# Patient Record
Sex: FEMALE | Race: WHITE | URBAN - METROPOLITAN AREA
[De-identification: names, ages, dates, MRNs, and addresses within clinical notes are randomized per-mention and may not be internally consistent; named-entity substitution may affect disease eponyms.]

---

## 2018-10-18 ENCOUNTER — TRANSFERRED RECORDS (OUTPATIENT)
Dept: HEALTH INFORMATION MANAGEMENT | Facility: CLINIC | Age: 28
End: 2018-10-18

## 2018-11-14 ENCOUNTER — TELEPHONE (OUTPATIENT)
Dept: OPHTHALMOLOGY | Facility: CLINIC | Age: 28
End: 2018-11-14

## 2018-11-14 NOTE — TELEPHONE ENCOUNTER
[facility] Call Center    Phone Message    May a detailed message be left on voicemail: yes    Reason for Call: Other: per pt- wondering if she is able to do a consult with another Dr in [place] since that is closer to home before her appt with Dr. Jo, please call pt back thanks     Action Taken: Message routed to:  [facility] ([facility] Clinic

## 2018-11-15 NOTE — TELEPHONE ENCOUNTER
Spoke to pt this AM    Pt traveling from [facility]  Was seen in [place], [place] last and referred to [place] for strabismus  No diplopia  No h/o prism in glasses    Reviewed if able to see strabismus specialist closer, may schedule with them for evaluation-- no near by MD's per pt or pt's eye doctors    After discussion pt will f/u as scheduled with dr. Sandro Toscano RN 12:25 PM 11/15/18

## 2018-11-26 ENCOUNTER — TELEPHONE (OUTPATIENT)
Dept: OPHTHALMOLOGY | Facility: CLINIC | Age: 28
End: 2018-11-26

## 2018-11-26 NOTE — TELEPHONE ENCOUNTER
Left voicemail for patient as patient is currently scheduled for Dr. Jo and appointment notes say surgery consult.  Dr. Jo would be able to evaluate, but he is not a surgeon so if interested in doing surgery would need to see a different provider.  I could have her see Dr. Leonard instead who would be able to do surgery.

## 2018-12-18 ENCOUNTER — OFFICE VISIT (OUTPATIENT)
Dept: OPHTHALMOLOGY | Facility: CLINIC | Age: 28
End: 2018-12-18
Attending: OPHTHALMOLOGY
Payer: COMMERCIAL

## 2018-12-18 DIAGNOSIS — H50.15 ALTERNATING EXOTROPIA: Primary | ICD-10-CM

## 2018-12-18 DIAGNOSIS — H53.2 DOUBLE VISION: ICD-10-CM

## 2018-12-18 DIAGNOSIS — H50.21 HYPERTROPIA OF RIGHT EYE: ICD-10-CM

## 2018-12-18 DIAGNOSIS — H53.10 SUBJECTIVE VISUAL DISTURBANCE: ICD-10-CM

## 2018-12-18 PROCEDURE — 92060 SENSORIMOTOR EXAMINATION: CPT | Mod: ZF | Performed by: OPHTHALMOLOGY

## 2018-12-18 PROCEDURE — G0463 HOSPITAL OUTPT CLINIC VISIT: HCPCS | Performed by: TECHNICIAN/TECHNOLOGIST

## 2018-12-18 ASSESSMENT — VISUAL ACUITY
OS_SC+: -1
OD_SC: 20/20
OS_SC: 20/20
METHOD: SNELLEN - LINEAR

## 2018-12-18 ASSESSMENT — SLIT LAMP EXAM - LIDS
COMMENTS: NORMAL
COMMENTS: NORMAL

## 2018-12-18 ASSESSMENT — EXTERNAL EXAM - LEFT EYE: OS_EXAM: NORMAL

## 2018-12-18 ASSESSMENT — TONOMETRY
IOP_METHOD: SINGLE/SINGLE LM ICARE
OD_IOP_MMHG: 21
OS_IOP_MMHG: 19

## 2018-12-18 ASSESSMENT — CONF VISUAL FIELD
OD_NORMAL: 1
OS_NORMAL: 1

## 2018-12-18 ASSESSMENT — EXTERNAL EXAM - RIGHT EYE: OD_EXAM: NORMAL

## 2018-12-18 NOTE — PROGRESS NOTES
1. Congenital exotropia with freely alternating exotropia (though patient has left eye preference).  Discussed risks, benefits, and alternatives of strabismus surgery and patient wishes to proceed.    This patient was referred from her optometrist Dr. Oglesby in [facility].  She has a history of congenital exotropia her entire life.  She has never undergone strabismus surgery.  She only rarely experiences double vision.  Over time her eyes have wandered out more and this has become very disruptive to her social interactions.  She has never undergone neuro imaging.  She reports no changes in vision in either eye under monocular conditions.  She denies any inciting event or acute onset of strabismus.    The patient has normal afferent visual function in both eyes including normal best corrected visual acuity, color vision, confrontation visual fields, and pupillary light responses in both eyes.  Sensorimotor examination reveals a concomitant 25-35 prism diopter alternating exotropia.  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  Extraocular motility was full in both eyes.  Structural eye examination including dilated fundus exam was unremarkable in both eyes. Cranial nerves V1-3, 7,8,9,11, and 12 were normal bilaterally.     With prism  correction of her strabismus she did experience diplopia however this appeared to be a fixation switch type of diplopia and she advised me that this was not bothersome to her.    We discussed in detail the risks, benefits, and alternatives of eye muscle correction surgery including the very rare risk of death or serious morbidity from a general anesthesia complication and the rare risk of severe vision loss in the operative eye(s) secondary to retinal detachment or endophthalmitis.  We discussed more likely sub-optimal outcomes including the unanticipated need for additional strabismus surgery.  Finally the patient was aware that prisms glasses may  be required to optimize single vision following surgery or that on VERY rare occasion she could have constant severe binocular diplopia after strabismus surgery that requires reversal of her strabismus surgery.  I do EXPECT she will have new diplopia after surgery but that she will learn to ignore it over the weeks after strabismus surgery.    After a thorough discussion of these risks, the patient decided to proceed with strabismus surgery.  The surgical plan is as follows:     1. Bilateral lateral rectus recession on fixed suture     Follow-up after strabismus surgery- will try to have phone call instead of 1-2 week post-operative visit.  [facility]  Prism free glasses for adjustment: patient does not wear glasses     Complete documentation of historical and exam elements from today's encounter can be found in the full encounter summary report (not reduplicated in this progress note).  I personally obtained the chief complaint(s) and history of present illness.  I confirmed and edited as necessary the review of systems, past medical/surgical history, family history, social history, and examination findings as documented by others; and I examined the patient myself.  I personally reviewed the relevant tests, images, and reports as documented above.  I formulated and edited as necessary the assessment and plan and discussed the findings and management plan with the patient and family     Pasquale Leonard MD

## 2018-12-18 NOTE — LETTER
2018    RE: Manisha Kilpatrick  : 1990  MRN: [id number]    Dear Dr. Oglesby,    Thank you for referring your patient, Manisha Kilpatrick, to my neuro-ophthalmology clinic recently.  After a thorough neuro-ophthalmic history and examination, I came to the following conclusions:        1. Congenital exotropia with freely alternating exotropia (though patient has left eye preference).  Discussed risks, benefits, and alternatives of strabismus surgery and patient wishes to proceed.    This patient was referred from her optometrist Dr. Oglesby in [facility].  She has a history of congenital exotropia her entire life.  She has never undergone strabismus surgery.  She only rarely experiences double vision.  Over time her eyes have wandered out more and this has become very disruptive to her social interactions.  She has never undergone neuro imaging.  She reports no changes in vision in either eye under monocular conditions.  She denies any inciting event or acute onset of strabismus.    The patient has normal afferent visual function in both eyes including normal best corrected visual acuity, color vision, confrontation visual fields, and pupillary light responses in both eyes.  Sensorimotor examination reveals a concomitant 25-35 prism diopter alternating exotropia.  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  Extraocular motility was full in both eyes.  Structural eye examination including dilated fundus exam was unremarkable in both eyes. Cranial nerves V1-3, 7,8,9,11, and 12 were normal bilaterally.     With prism  correction of her strabismus she did experience diplopia however this appeared to be a fixation switch type of diplopia and she advised me that this was not bothersome to her.    We discussed in detail the risks, benefits, and alternatives of eye muscle correction surgery including the very rare risk of death or serious morbidity from a general anesthesia  complication and the rare risk of severe vision loss in the operative eye(s) secondary to retinal detachment or endophthalmitis.  We discussed more likely sub-optimal outcomes including the unanticipated need for additional strabismus surgery.  Finally the patient was aware that prisms glasses may be required to optimize single vision following surgery or that on VERY rare occasion she could have constant severe binocular diplopia after strabismus surgery that requires reversal of her strabismus surgery.  I do EXPECT she will have new diplopia after surgery but that she will learn to ignore it over the weeks after strabismus surgery.    After a thorough discussion of these risks, the patient decided to proceed with strabismus surgery.  The surgical plan is as follows:     1. Bilateral lateral rectus recession on fixed suture     Follow-up after strabismus surgery- will try to have phone call instead of 1-2 week post-operative visit.  [facility] at[facility]  Prism free glasses for adjustment: patient does not wear glasses    Again, thank you for trusting me with the care of your patient.  For further exam details, please feel free to contact our office for additional records.  If you wish to contact me regarding this patient please email me at [email or web address] or give my clinic a call to arrange a phone conversation.    Sincerely,    Pasquale Leonard MD  , Neuro-Ophthalmology and Adult Strabismus Surgery  The Kip Bruno Chair in Neuro-Ophthalmology  Department of Ophthalmology and Visual Neurosciences  [facility]    DX: alternating exotropia, strabismus surgery       The [facility] will be hosting the second annual Neuro-ophthalmology and Oculoplastics review course for Optometrists. We hope you can join us!    Who:  All Optometrists  What: Neuro-ophthalmology and Oculoplastics Review for Optometrists:     When to Manage and When to Refer  When:  Friday, March 1, 2019  COPE credits will be available for all lectures and case discussion sessions  8:30-9:00  Registration and Coffee & Pastries   9:00 Update on NMO and MOG optic neuritis           9:30 Maculopathies Which Mimic Optic Neuropathy       10:00 The Complex Lower Lid                                                       10:30 Break   10:45 Thyroid Eye Disease                                                                 11:30 New Microinvasive Surgical Options for Floaters and Glaucoma                          12:00 Buffet Lunch   12:30 Ptosis Rules of the Road                                                     1:00 Tearing                                                                                          1:30 The Diagnosis and Management of Giant Cell Arteritis: A collaborative approach  between eye care providers and rheumatology                                                  2:15 Break - Cocktails & Appetizers  2:30 Cases with Colleagues   4:00     Course Ends   Where:[facility], [place] [address]  Why: To improve the care of challenging patients.  To earn COPE credits!  How: Online registration can be completed at:    [email or web address]  Cost = $100 early bird registration (before Friday, February 15, 2019)              $125 up to the day of the event

## 2019-01-11 ENCOUNTER — ANESTHESIA EVENT (OUTPATIENT)
Dept: SURGERY | Facility: AMBULATORY SURGERY CENTER | Age: 29
End: 2019-01-11

## 2019-01-14 ENCOUNTER — HOSPITAL ENCOUNTER (OUTPATIENT)
Facility: AMBULATORY SURGERY CENTER | Age: 29
End: 2019-01-14
Attending: OPHTHALMOLOGY
Payer: COMMERCIAL

## 2019-01-14 ENCOUNTER — ANESTHESIA (OUTPATIENT)
Dept: SURGERY | Facility: AMBULATORY SURGERY CENTER | Age: 29
End: 2019-01-14

## 2019-01-14 VITALS
OXYGEN SATURATION: 97 % | HEART RATE: 76 BPM | BODY MASS INDEX: 23.04 KG/M2 | HEIGHT: 63 IN | RESPIRATION RATE: 14 BRPM | SYSTOLIC BLOOD PRESSURE: 113 MMHG | TEMPERATURE: 98.5 F | DIASTOLIC BLOOD PRESSURE: 66 MMHG | WEIGHT: 130 LBS

## 2019-01-14 DIAGNOSIS — Z48.810 AFTERCARE FOLLOWING SURGERY OF A SENSE ORGAN: Primary | ICD-10-CM

## 2019-01-14 LAB
HCG UR QL: NEGATIVE
INTERNAL QC OK POCT: YES

## 2019-01-14 RX ORDER — GLYCOPYRROLATE 0.2 MG/ML
INJECTION, SOLUTION INTRAMUSCULAR; INTRAVENOUS PRN
Status: DISCONTINUED | OUTPATIENT
Start: 2019-01-14 | End: 2019-01-14

## 2019-01-14 RX ORDER — SODIUM CHLORIDE, SODIUM LACTATE, POTASSIUM CHLORIDE, CALCIUM CHLORIDE 600; 310; 30; 20 MG/100ML; MG/100ML; MG/100ML; MG/100ML
INJECTION, SOLUTION INTRAVENOUS CONTINUOUS PRN
Status: DISCONTINUED | OUTPATIENT
Start: 2019-01-14 | End: 2019-01-14

## 2019-01-14 RX ORDER — ONDANSETRON 2 MG/ML
4 INJECTION INTRAMUSCULAR; INTRAVENOUS EVERY 30 MIN PRN
Status: DISCONTINUED | OUTPATIENT
Start: 2019-01-14 | End: 2019-01-15 | Stop reason: HOSPADM

## 2019-01-14 RX ORDER — NALOXONE HYDROCHLORIDE 0.4 MG/ML
.1-.4 INJECTION, SOLUTION INTRAMUSCULAR; INTRAVENOUS; SUBCUTANEOUS
Status: DISCONTINUED | OUTPATIENT
Start: 2019-01-14 | End: 2019-01-15 | Stop reason: HOSPADM

## 2019-01-14 RX ORDER — LIDOCAINE 40 MG/G
CREAM TOPICAL
Status: DISCONTINUED | OUTPATIENT
Start: 2019-01-14 | End: 2019-01-14 | Stop reason: HOSPADM

## 2019-01-14 RX ORDER — FENTANYL CITRATE 50 UG/ML
25-50 INJECTION, SOLUTION INTRAMUSCULAR; INTRAVENOUS
Status: DISCONTINUED | OUTPATIENT
Start: 2019-01-14 | End: 2019-01-15 | Stop reason: HOSPADM

## 2019-01-14 RX ORDER — OXYMETAZOLINE HYDROCHLORIDE 0.05 G/100ML
SPRAY NASAL PRN
Status: DISCONTINUED | OUTPATIENT
Start: 2019-01-14 | End: 2019-01-14 | Stop reason: HOSPADM

## 2019-01-14 RX ORDER — SODIUM CHLORIDE, SODIUM LACTATE, POTASSIUM CHLORIDE, CALCIUM CHLORIDE 600; 310; 30; 20 MG/100ML; MG/100ML; MG/100ML; MG/100ML
INJECTION, SOLUTION INTRAVENOUS CONTINUOUS
Status: DISCONTINUED | OUTPATIENT
Start: 2019-01-14 | End: 2019-01-15 | Stop reason: HOSPADM

## 2019-01-14 RX ORDER — BALANCED SALT SOLUTION 6.4; .75; .48; .3; 3.9; 1.7 MG/ML; MG/ML; MG/ML; MG/ML; MG/ML; MG/ML
SOLUTION OPHTHALMIC PRN
Status: DISCONTINUED | OUTPATIENT
Start: 2019-01-14 | End: 2019-01-14 | Stop reason: HOSPADM

## 2019-01-14 RX ORDER — SODIUM CHLORIDE, SODIUM LACTATE, POTASSIUM CHLORIDE, CALCIUM CHLORIDE 600; 310; 30; 20 MG/100ML; MG/100ML; MG/100ML; MG/100ML
INJECTION, SOLUTION INTRAVENOUS CONTINUOUS
Status: DISCONTINUED | OUTPATIENT
Start: 2019-01-14 | End: 2019-01-14 | Stop reason: HOSPADM

## 2019-01-14 RX ORDER — MEPERIDINE HYDROCHLORIDE 25 MG/ML
12.5 INJECTION INTRAMUSCULAR; INTRAVENOUS; SUBCUTANEOUS
Status: DISCONTINUED | OUTPATIENT
Start: 2019-01-14 | End: 2019-01-15 | Stop reason: HOSPADM

## 2019-01-14 RX ORDER — OXYCODONE HYDROCHLORIDE 5 MG/1
5 TABLET ORAL EVERY 4 HOURS PRN
Status: DISCONTINUED | OUTPATIENT
Start: 2019-01-14 | End: 2019-01-15 | Stop reason: HOSPADM

## 2019-01-14 RX ORDER — LIDOCAINE HYDROCHLORIDE 20 MG/ML
INJECTION, SOLUTION INFILTRATION; PERINEURAL PRN
Status: DISCONTINUED | OUTPATIENT
Start: 2019-01-14 | End: 2019-01-14

## 2019-01-14 RX ORDER — PROPOFOL 10 MG/ML
INJECTION, EMULSION INTRAVENOUS PRN
Status: DISCONTINUED | OUTPATIENT
Start: 2019-01-14 | End: 2019-01-14

## 2019-01-14 RX ORDER — HYDRALAZINE HYDROCHLORIDE 20 MG/ML
2.5-5 INJECTION INTRAMUSCULAR; INTRAVENOUS EVERY 10 MIN PRN
Status: DISCONTINUED | OUTPATIENT
Start: 2019-01-14 | End: 2019-01-14 | Stop reason: HOSPADM

## 2019-01-14 RX ORDER — FENTANYL CITRATE 50 UG/ML
25-50 INJECTION, SOLUTION INTRAMUSCULAR; INTRAVENOUS
Status: DISCONTINUED | OUTPATIENT
Start: 2019-01-14 | End: 2019-01-14 | Stop reason: HOSPADM

## 2019-01-14 RX ORDER — DEXAMETHASONE SODIUM PHOSPHATE 4 MG/ML
INJECTION, SOLUTION INTRA-ARTICULAR; INTRALESIONAL; INTRAMUSCULAR; INTRAVENOUS; SOFT TISSUE PRN
Status: DISCONTINUED | OUTPATIENT
Start: 2019-01-14 | End: 2019-01-14

## 2019-01-14 RX ORDER — ONDANSETRON 4 MG/1
4 TABLET, ORALLY DISINTEGRATING ORAL EVERY 30 MIN PRN
Status: DISCONTINUED | OUTPATIENT
Start: 2019-01-14 | End: 2019-01-15 | Stop reason: HOSPADM

## 2019-01-14 RX ORDER — FENTANYL CITRATE 50 UG/ML
INJECTION, SOLUTION INTRAMUSCULAR; INTRAVENOUS PRN
Status: DISCONTINUED | OUTPATIENT
Start: 2019-01-14 | End: 2019-01-14

## 2019-01-14 RX ORDER — ACETAMINOPHEN 325 MG/1
975 TABLET ORAL ONCE
Status: COMPLETED | OUTPATIENT
Start: 2019-01-14 | End: 2019-01-14

## 2019-01-14 RX ORDER — GABAPENTIN 300 MG/1
300 CAPSULE ORAL ONCE
Status: COMPLETED | OUTPATIENT
Start: 2019-01-14 | End: 2019-01-14

## 2019-01-14 RX ORDER — HYDROMORPHONE HYDROCHLORIDE 1 MG/ML
.3-.5 INJECTION, SOLUTION INTRAMUSCULAR; INTRAVENOUS; SUBCUTANEOUS EVERY 10 MIN PRN
Status: DISCONTINUED | OUTPATIENT
Start: 2019-01-14 | End: 2019-01-15 | Stop reason: HOSPADM

## 2019-01-14 RX ORDER — PREDNISOLONE ACETATE 10 MG/ML
SUSPENSION/ DROPS OPHTHALMIC
Qty: 4 ML | Refills: 0 | Status: SHIPPED | OUTPATIENT
Start: 2019-01-14

## 2019-01-14 RX ORDER — ONDANSETRON 2 MG/ML
INJECTION INTRAMUSCULAR; INTRAVENOUS PRN
Status: DISCONTINUED | OUTPATIENT
Start: 2019-01-14 | End: 2019-01-14

## 2019-01-14 RX ORDER — PROPOFOL 10 MG/ML
INJECTION, EMULSION INTRAVENOUS CONTINUOUS PRN
Status: DISCONTINUED | OUTPATIENT
Start: 2019-01-14 | End: 2019-01-14

## 2019-01-14 RX ADMIN — FENTANYL CITRATE 50 MCG: 50 INJECTION, SOLUTION INTRAMUSCULAR; INTRAVENOUS at 10:12

## 2019-01-14 RX ADMIN — ONDANSETRON 4 MG: 2 INJECTION INTRAMUSCULAR; INTRAVENOUS at 10:55

## 2019-01-14 RX ADMIN — ONDANSETRON 4 MG: 2 INJECTION INTRAMUSCULAR; INTRAVENOUS at 10:15

## 2019-01-14 RX ADMIN — PROPOFOL 200 MG: 10 INJECTION, EMULSION INTRAVENOUS at 10:14

## 2019-01-14 RX ADMIN — GABAPENTIN 300 MG: 300 CAPSULE ORAL at 09:13

## 2019-01-14 RX ADMIN — PROPOFOL 200 MCG/KG/MIN: 10 INJECTION, EMULSION INTRAVENOUS at 10:14

## 2019-01-14 RX ADMIN — LIDOCAINE HYDROCHLORIDE 60 MG: 20 INJECTION, SOLUTION INFILTRATION; PERINEURAL at 10:14

## 2019-01-14 RX ADMIN — SODIUM CHLORIDE, SODIUM LACTATE, POTASSIUM CHLORIDE, CALCIUM CHLORIDE: 600; 310; 30; 20 INJECTION, SOLUTION INTRAVENOUS at 10:07

## 2019-01-14 RX ADMIN — OXYCODONE HYDROCHLORIDE 5 MG: 5 TABLET ORAL at 11:16

## 2019-01-14 RX ADMIN — ACETAMINOPHEN 975 MG: 325 TABLET ORAL at 09:13

## 2019-01-14 RX ADMIN — GLYCOPYRROLATE 0.2 MG: 0.2 INJECTION, SOLUTION INTRAMUSCULAR; INTRAVENOUS at 10:12

## 2019-01-14 RX ADMIN — DEXAMETHASONE SODIUM PHOSPHATE 4 MG: 4 INJECTION, SOLUTION INTRA-ARTICULAR; INTRALESIONAL; INTRAMUSCULAR; INTRAVENOUS; SOFT TISSUE at 10:20

## 2019-01-14 ASSESSMENT — MIFFLIN-ST. JEOR: SCORE: 1283.81

## 2019-01-14 NOTE — ANESTHESIA POSTPROCEDURE EVALUATION
Anesthesia POST Procedure Evaluation    Patient: Manisha Kilpatrick   MRN:     [id number] Gender:   female   Age:    28 year old :      1990        Preoperative Diagnosis: Strabismus   Procedure(s):  Bilateral Strabismus Repair   Postop Comments: No value filed.       Anesthesia Type:  General    Reportable Event: NO     PAIN: Uncomplicated   Sign Out status: Comfortable, Well controlled pain     PONV: No PONV   Sign Out status:  No Nausea or Vomiting     Neuro/Psych: Uneventful perioperative course   Sign Out Status: Preoperative baseline; Age appropriate mentation     Airway/Resp.: Uneventful perioperative course   Sign Out Status: Non labored breathing, age appropriate RR; Resp. Status within EXPECTED Parameters     CV: Uneventful perioperative course   Sign Out status: Appropriate BP and perfusion indices; Appropriate HR/Rhythm     Disposition:   Sign Out in:  PACU  Disposition:  Phase II; Home  Recovery Course: Uneventful  Follow-Up: Not required           Last Anesthesia Record Vitals:  CRNA VITALS  2019 1031 - 2019 1131      2019             Pulse:  89    SpO2:  99 %    Resp Rate (observed):  30          Last PACU/Preop Vitals:  Vitals:    19 1115 19 1126 19 1154   BP: 109/66 109/86 113/66   Pulse: 76     Resp:    Temp:  36.9  C (98.5  F)    SpO2: 95% 96% 97%         Electronically Signed By: Kain Alvarez MD, MD, 2019, 1:53 PM

## 2019-01-14 NOTE — ANESTHESIA CARE TRANSFER NOTE
Patient: Manisha Kilpatrick    Procedure(s):  Bilateral Strabismus Repair    Diagnosis: Strabismus  Diagnosis Additional Information: No value filed.    Anesthesia Type:   No value filed.     Note:  Airway :Nasal Cannula  Patient transferred to:PACU  Comments: Arrive PACU, Stable, Airway Intact  116/67, 86,14,{S98%98%at  All questions answered.Handoff Report: Identifed the Patient, Identified the Reponsible Provider, Reviewed the pertinent medical history, Discussed the surgical course, Reviewed Intra-OP anesthesia mangement and issues during anesthesia, Set expectations for post-procedure period and Allowed opportunity for questions and acknowledgement of understanding      Vitals: (Last set prior to Anesthesia Care Transfer)    CRNA VITALS  1/14/2019 1031 - 1/14/2019 1105      1/14/2019             Pulse:  89    SpO2:  99 %    Resp Rate (observed):  30                Electronically Signed By: SERGEY Chand CRNA  January 14, 2019  11:05 AM

## 2019-01-14 NOTE — OP NOTE
"ATTENDING SURGEON:  Pasquale Leonard MD    DATE OF PROCEDURE:  January 14, 2019    FIRST SURGICAL ASSISTANT:  AARON NÚÑEZ MD     SECOND SURGICAL ASSISTANT:   DANE AYALA MD     ANESTHESIA:  General anesthesia    PREOPERATIVE DIAGNOSIS (ES):  1. Exotropia     POSTOPERATIVE DIAGNOSIS (ES):  Same    NAME OF OPERATION:  1. Right lateral rectus recession 7.0 mm   2. Left lateral rectus recession 7.0 mm     INDICATIONS FOR PROCEDURE:    The patient is a pleasant 28 year old female with a past ocular history of congenital exotropia that was increasing over years and led to significant disfigurement.  She was highly motivated for reconstructive strabismus surgery.    I warned the patient about the risks, benefits, and alternatives of eye muscle surgery including a relatively small risk for severe infection, retinal detachment, and permanent vision loss of the eye.  I also discussed the possibility of postoperative double vision.  I discussed the possibility that due to postoperative double vision or a postoperative recurrent strabismus, the patient may require additional strabismus procedures in the future.  Understanding these risks, the patient decided to proceed with strabismus surgery.      DESCRIPTION OF PROCEDURE:    After the risks, benefits, and alternatives of eye muscle surgery were discussed with the patient and the patient's questions were answered both in clinic and on the day of surgery, written informed consent was obtained and witnessed in the preoperative holding area on the day of surgery.  The word \"yes\" was written over both eyes indicating that the patient consented to eye muscle correction in both eyes.  An intravenous line was placed and light intravenous sedation was given.  The patient was transported to the operating room where after appropriate monitors were placed, the patient underwent induction of general anesthesia without complication.      Both eyes were prepped and draped in the usual " sterile fashion for ophthalmic surgery and a lid speculum was placed in the right eye.  Forced duction testing in this eye revealed no restriction.  A trapezoidal temporal conjunctival flap was created using conjunctival forceps and Isaura scissors.  This was reflected backwards to expose the right lateral rectus muscle which was isolated using a [place] muscle hook.  The muscle was freed from Tenon's capsule with blunt dissection using a Isaura scissors and cotton swab.  A double armed 6-0 Vicryl suture was then woven across the width of the muscle near it's insertion of the globe and tied to the edges.  The muscle was disinserted from the globe using Isaura scissors.  Both arms of the 6-0 Vicryl suture were then passed partial thickness through the sclera at a point measured to be 7.0 mm posterior to the physiologic muscle insertion using a caliper.  At this point, the ends of the suture were tied together.  The needles were cut off and the ends were cut short.  The conjunctival flap was then re-opposed in the surgical bed and held in place using two 6-0 plain gut sutures.  The lid speculum was removed from the operative eye.     A lid speculum was placed in the left eye.  Forced duction testing in this eye revealed no restriction.  A trapezoidal temporal conjunctival flap was created using conjunctival forceps and Isaura scissors.  This was reflected backwards to expose the left lateral rectus muscle which was isolated using a [place] muscle hook.  The muscle was freed from Tenon's capsule with blunt dissection using a Isaura scissors and cotton swab.  A double armed 6-0 Vicryl suture was then woven across the width of the muscle near it's insertion of the globe and tied to the edges.  The muscle was disinserted from the globe using Isaura scissors.  Both arms of the 6-0 Vicryl suture were then passed partial thickness through the sclera at a point measured to be 7.0 mm posterior to the physiologic  muscle insertion using a caliper.  At this point, the ends of the suture were tied together.  The needles were cut off and the ends were cut short.  The conjunctival flap was then re-opposed in the surgical bed and held in place using two 6-0 plain gut sutures.  The lid speculum was removed from the operative eye.     Maxitrol ointment was placed in both eyes.  The patient was awakened from general anesthesia without complication and discharged to the recovery room in stable condition.       SPECIMENS REMOVED:  None.      ESTIMATED BLOOD LOSS:  1 mL or less.    INTRAOPERATIVE FLUIDS:  As per anesthesia records.      SPONGE/INSTRUMENT/NEEDLE COUNTS:  All sponge, instrument, and needle counts were correct times two.    CONDITION ON DISCHARGE FROM OPERATING ROOM:  Stable.      COMPLICATIONS:  None.     I was present for the entire procedure

## 2019-01-14 NOTE — BRIEF OP NOTE
[facility] Brief Operative Note    Pre-operative diagnosis: Strabismus   Post-operative diagnosis * No post-op diagnosis entered *  same   Procedure: Procedure(s):  Bilateral Strabismus Repair   Surgeon(s): Surgeon(s) and Role:     * Pasquale Leonard MD - Primary     * Navneet Merritt MD - Fellow - Assisting   Ward Ascencio MD - Resident   Estimated blood loss: * No values recorded between 1/14/2019 10:22 AM and 1/14/2019 11:02 AM *    Specimens: * No specimens in log *   Findings: none

## 2019-01-14 NOTE — DISCHARGE INSTRUCTIONS
Instructions for after your eye muscle surgery:    Instill 1 cm of Maxitrol ophthalmic ointment in the operative eye(s) in 3 times per day until follow-up with Dr. Leonard in about 1 week.  The ointment is expected to blur vision but is necessary.      You will also go home with a prescription for a steroid eye drop. Do NOT start this eye drop yet.  When you see Dr. Leonard at your post-operative visit he will tell you if and when to start the drops.    Avoid all eye pressure or trauma for 7 days.  No eye rubbing, straining, swimming, athletics, or outdoor activities in which dirt or foreign objects could enter the eye.      ok to take a bath and shower immediately but don t run shower water on face.      Tylenol or ibuprofen over the counter may be used for pain.  Pain can occasionally be moderate for 1 or 2 days after surgery.  If pain is severe or does not improve greatly with over the counter medications call our office.    Return for follow-up with Dr. Leonard as already scheduled (generally about 1 week after surgery).  If you do not have an appointment already, please call Sandro Whittaker at [phone] or our  at [phone] and arrange to follow-up in 1 week.     [facility]  Home Care Following Anesthesia  For 24 hours after surgery:  1. Get plenty of rest.  A responsible adult must stay with you for at least 24 hours after you leave the surgery center.  2. Do not drive or use heavy equipment.  If you have weakness or tingling, don't drive or use heavy equipment until this feeling goes away.   3. Do not drink alcohol.   4. Avoid strenuous or risky activities.  Ask for help when climbing stairs.  5. You may feel lightheaded.  IF so, sit for a few minutes before standing.  Have someone help you get up.   6. If you have nausea (feel sick to your stomach): Drink only clear liquids such as apple juice, ginger ale, broth or 7-Up.  Rest may also help.   Be sure to drink enough fluids.  Move to a regular diet as you feel able.   7. You may have a slight fever.  Call the doctor if your fever is over 100 F (37.7 C) (taken under the tongue) or lasts longer than 24 hours.  8. You may have a dry mouth, a sore throat, muscle aches or trouble sleeping. These should go away after 24 hours.  9. Do not make important or legal decisions.       Tips for taking pain medications  To get the best pain relief possible, remember these points:    Take pain medications as directed, before pain becomes severe.    Pain medication can upset your stomach: taking it with food may help.    Constipation is a common side effect of pain medication. Drink plenty of  fluids.    Eat foods high in fiber. Take a stool softener if recommended by your doctor or pharmacist.    Do not drink alcohol, drive or operate machinery while taking pain medications.    Ask about other ways to control pain, such as with heat, ice or relaxation.    Tylenol/Acetaminophen Consumption  To help encourage the safe use of acetaminophen, the makers of TYLENOL  have lowered the maximum daily dose for single-ingredient Extra Strength TYLENOL  (acetaminophen) products sold in the [place] from 8 pills per day (4,000 mg) to 6 pills per day (3,000 mg). The dosing interval has also changed from 2 pills every 4-6 hours to 2 pills every 6 hours.    If you feel your pain relief is insufficient, you may take Tylenol/Acetaminophen in addition to your narcotic pain medication.     Be careful not to exceed 3,000 mg of Tylenol/Acetaminophen in a 24 hour period from all sources.    If you are taking extra strength Tylenol/acetaminophen (500 mg), the maximum dose is 6 tablets in 24 hours.    If you are taking regular strength acetaminophen (325 mg), the maximum dose is 9 tablets in 24 hours.  **975 mg Tylenol given at 0915, can take again at 0315.    Call a doctor for any of the followin. Signs of infection (fever, growing tenderness  at the surgery site, a large amount of drainage or bleeding, severe pain, foul-smelling drainage, redness, swelling).  2. It has been over 8 to 10 hours since surgery and you are still not able to urinate (pass water).  3. Headache for over 24 hours.  Your doctor is:       Dr. Pasquale Leonard, Ophthalmology: [phone]               Or dial [phone] and ask for the resident on call for:  Ophthalmology  For emergency care, call the:  [place] [facility]:  [phone] (TTY for hearing impaired: [phone])

## 2019-01-14 NOTE — ANESTHESIA PREPROCEDURE EVALUATION
Anesthesia Pre-Procedure Evaluation    Patient: Manisha Kilpatrick   MRN:     [id number] Gender:   female   Age:    28 year old :      1990        Preoperative Diagnosis: Strabismus   Procedure(s):  Bilateral Strabismus Repair     History reviewed. No pertinent past medical history.   History reviewed. No pertinent surgical history.       Anesthesia Evaluation     . Pt has had prior anesthetic. Type: Regional    No history of anesthetic complications          ROS/MED HX    ENT/Pulmonary:  - neg pulmonary ROS     Neurologic:     (+)other neuro strabismus    Cardiovascular:  - neg cardiovascular ROS   (+) ----. : . . . :. . Previous cardiac testing date:results:date: results:ECG reviewed date: results:Sinus joce date: results:          METS/Exercise Tolerance:  >4 METS   Hematologic:  - neg hematologic  ROS       Musculoskeletal:  - neg musculoskeletal ROS       GI/Hepatic:  - neg GI/hepatic ROS       Renal/Genitourinary:  - ROS Renal section negative       Endo:  - neg endo ROS       Psychiatric:  - neg psychiatric ROS       Infectious Disease:  - neg infectious disease ROS       Malignancy:      - no malignancy   Other:    (+) No chance of pregnancy                        PHYSICAL EXAM:   Mental Status/Neuro: A/A/O   Airway: Facies: Feasible  Mallampati: II  Mouth/Opening: Full  TM distance: > 6 cm  Neck ROM: Full   Respiratory: Auscultation: CTAB     Resp. Rate: Normal     Resp. Effort: Normal      CV: Rhythm: Regular  Rate: Age appropriate  Heart: Normal Sounds   Comments:      Dental: Normal                  Lab Results   Component Value Date    HCG Negative 2019       Preop Vitals  BP Readings from Last 3 Encounters:   19 106/74    Pulse Readings from Last 3 Encounters:   19 63      Resp Readings from Last 3 Encounters:   19 16    SpO2 Readings from Last 3 Encounters:   19 98%      Temp Readings from Last 1 Encounters:   19 36.8  C (98.2  F) (Oral)    Ht Readings from  "Last 1 Encounters:   01/14/19 1.6 m (5' 3\")      Wt Readings from Last 1 Encounters:   01/14/19 59 kg (130 lb)    Estimated body mass index is 23.03 kg/m  as calculated from the following:    Height as of this encounter: 1.6 m (5' 3\").    Weight as of this encounter: 59 kg (130 lb).     LDA:  Peripheral IV 01/14/19 Left Hand (Active)   Site Assessment WDL 1/14/2019  9:10 AM   Line Status Infusing 1/14/2019  9:10 AM   Phlebitis Scale 0-->no symptoms 1/14/2019  9:10 AM   Infiltration Scale 0 1/14/2019  9:10 AM   Number of days: 0       Airway - Adult/Peds laryngeal mask airway (Active)   Number of days: 0            Assessment:   ASA SCORE: 1    NPO Status: > 6 hours since completed Solid Foods   Documentation: H&P complete; Preop Testing complete; Consents complete   Proceeding: Proceed without further delay  Tobacco Use:  NO Active use of Tobacco/UNKNOWN Tobacco use status     Plan:   Anes. Type:  General   Pre-Induction: Midazolam IV; Acetaminophen PO   Induction:  IV (Standard)   Airway: LMA   Access/Monitoring: PIV   Maintenance: Balanced   Emergence: Procedure Site   Logistics: Same Day Surgery     Postop Pain/Sedation Strategy:  Standard-Options: Opioids PRN     PONV Management:  Adult Risk Factors: Female, Non-Smoker, Postop Opioids  Prevention: Ondansetron; Dexamethasone     CONSENT: Direct conversation   Plan and risks discussed with: Patient                            Damian Gongora MD  "

## 2019-01-17 ENCOUNTER — TELEPHONE (OUTPATIENT)
Dept: OPHTHALMOLOGY | Facility: CLINIC | Age: 29
End: 2019-01-17

## 2019-01-17 NOTE — TELEPHONE ENCOUNTER
Pain: moderate discomfort   Using OTC medication: tylenol to manage    Abnormal swelling or purulent discharge: none noted    Patient using ointment as directed (TID): yes    Vision: no double vision per patient. Vision seems fine, no complaints. Happy with things so far.     Reminded patient of post op date (see below - not following up in 1 week per usual) and provided instructions for weekly eye drop taper(QID, TID, BID, QDAY). Provided patient with direct line in case of questions or concerns    Anytime during the day - scheduled phone call with patient  at 12:30pm for phone call with Dr. Aisha Obrien  Neuro-Ophthalmology Clinical Facilitator  [phone]  10:14 AM 2019     -------------------------------  Follow-up phone call   Received: 3 days ago   Message Contents   Pasquale Leonard MD [place], Linnea Mishra,     This patient is NOT planning to follow-up with me for the Postoperative week 1-2 visit because she lives so far away (unless she is having a problem).  When you call her please review in detail the followin. Ointment three times a day for 7 days   2. Prednisolone acetate 1% four times a day  Taper every week (to complete in 4 weeks).     PLease find out a time when I can talk to her (during on of my clinic days).  I'll have you get her on the phone for me (sometime around 2 weeks out) so I can talk to her.     Thank you. - Pasquale

## 2019-01-23 ENCOUNTER — TELEPHONE (OUTPATIENT)
Dept: OPHTHALMOLOGY | Facility: CLINIC | Age: 29
End: 2019-01-23

## 2019-01-23 NOTE — TELEPHONE ENCOUNTER
----- Message from Pasquale Leonard MD sent at 1/22/2019  4:01 PM CST -----  Regarding: Please schedule a f/u visit with me  Danica,    Please call patient tomorrow to schedule a follow-up visit with me for 3-4 months from now.  No less than 3 months please.    Thank you. - Pasquale

## 2019-01-28 NOTE — NURSING NOTE
Chief Complaint(s) and History of Present Illness(es)     Exotropia Evaluation     Laterality: both eyes    Onset: present since childhood    Frequency: constantly    Treatments tried: glasses              Comments     XT since childhood no h/o strab surgery or patching, wore gls as child, has worsened since having a child, interested in surgery, no h/o head trauma,  no h/o MRI/CT, no diplopia, VA good each eye, wears gls only for computer work                 
(4) excellent

## 2019-01-31 ENCOUNTER — TELEPHONE (OUTPATIENT)
Dept: OPHTHALMOLOGY | Facility: CLINIC | Age: 29
End: 2019-01-31

## 2019-02-15 ENCOUNTER — TELEPHONE (OUTPATIENT)
Dept: OPHTHALMOLOGY | Facility: CLINIC | Age: 29
End: 2019-02-15

## 2019-03-29 ENCOUNTER — OFFICE VISIT (OUTPATIENT)
Dept: OPHTHALMOLOGY | Facility: CLINIC | Age: 29
End: 2019-03-29
Attending: OPHTHALMOLOGY
Payer: COMMERCIAL

## 2019-03-29 DIAGNOSIS — H50.15 ALTERNATING EXOTROPIA: Primary | ICD-10-CM

## 2019-03-29 DIAGNOSIS — H53.2 DOUBLE VISION: ICD-10-CM

## 2019-03-29 DIAGNOSIS — H53.10 SUBJECTIVE VISUAL DISTURBANCE: ICD-10-CM

## 2019-03-29 PROCEDURE — G0463 HOSPITAL OUTPT CLINIC VISIT: HCPCS | Mod: ZF

## 2019-03-29 ASSESSMENT — TONOMETRY
IOP_METHOD: ICARE
OS_IOP_MMHG: 18
OD_IOP_MMHG: 16

## 2019-03-29 ASSESSMENT — VISUAL ACUITY
METHOD: SNELLEN - LINEAR
OD_SC: 20/20
OS_SC: 20/20

## 2019-03-29 ASSESSMENT — SLIT LAMP EXAM - LIDS
COMMENTS: NORMAL
COMMENTS: NORMAL

## 2019-03-29 NOTE — PROGRESS NOTES
1. 2.5 months post-op status post strabismus surgery:     -Surgery: 1/14/19    1. Right lateral rectus recession 7.0 mm   2. Left lateral rectus recession 7.0 mm      - Alignment: left exotropia 8 prism diopters  - Diplopia: none   - Healed up appropriately.   - patient notes some subtle drifting of her left eye still but dramatically better than before.   - she tells us today that she has had strabismus surgery as a baby.   - in the future, if exotropia increases again and patient wishes to pursue another surgery, will plan to explore medials and resect / advance with use of adjustable suture.     return to clinic as needed.     Follow-up with Dr. Oglesby for her primary eye care.       Complete documentation of historical and exam elements from today's encounter can be found in the full encounter summary report (not reduplicated in this progress note).  I personally obtained the chief complaint(s) and history of present illness.  I confirmed and edited as necessary the review of systems, past medical/surgical history, family history, social history, and examination findings as documented by others; and I examined the patient myself.  I personally reviewed the relevant tests, images, and reports as documented above.  I formulated and edited as necessary the assessment and plan and discussed the findings and management plan with the patient and family     Pasquale Leonard MD

## 2019-03-29 NOTE — LETTER
2019    RE: Manisha Kilpatrick  : 1990  MRN: [id number]    Dear Dr. Oglesby,    Thank you for referring your patient, Manisha Kilpatrick, to my neuro-ophthalmology clinic for strabismus surgery.  She is doing well after strabismus surgery and will follow-up with you for routine care moving forward.      1. 2.5 months post-op status post strabismus surgery:     -Surgery: 19   1. Right lateral rectus recession 7.0 mm    2. Left lateral rectus recession 7.0 mm      - Alignment: left exotropia 8 prism diopters  - Diplopia: none   - Healed up appropriately.   - patient notes some subtle drifting of her left eye still but dramatically better than before.   - she tells us today that she has had strabismus surgery as a baby.   - in the future, if exotropia increases again and patient wishes to pursue another surgery, will plan to explore medials and resect / advance with use of adjustable suture.     return to clinic as needed.     Follow-up with Dr. Oglesby for her primary eye care.    Again, thank you for trusting me with the care of your patient.  For further exam details, please feel free to contact our office for additional records.  If you wish to contact me regarding this patient please email me at [email or web address] or give my clinic a call to arrange a phone conversation.    Sincerely,    Pasquale Leonard MD  , Neuro-Ophthalmology and Adult Strabismus Surgery  [facility] in Neuro-Ophthalmology  Department of Ophthalmology and Visual Neurosciences  [facility]

## 2019-03-29 NOTE — NURSING NOTE
Chief Complaints and History of Present Illnesses   Patient presents with     Post Op (Ophthalmology) Both Eyes      Chief Complaint(s) and History of Present Illness(es)     Post Op (Ophthalmology) Both Eyes     Laterality: both eyes              Comments     3 month post op of Bilateral Strabismus Repair.  Patient is not noticing double vision since the surgery.  Notices that eyes are drifting but not as much as they were before the surgery.  Would like to see her eyes closer together but overall happy with results.  Eye meds: none  RADHA Powell 3/29/2019 9:30 AM

## 2019-05-03 ENCOUNTER — HEALTH MAINTENANCE LETTER (OUTPATIENT)
Age: 29
End: 2019-05-03

## 2019-10-18 ENCOUNTER — LAB (OUTPATIENT)
Dept: FAMILY MEDICINE | Facility: CLINIC | Age: 29
End: 2019-10-18

## 2019-10-18 PROCEDURE — 99000 SPECIMEN HANDLING OFFICE-LAB: CPT | Performed by: PREVENTIVE MEDICINE

## 2020-03-11 ENCOUNTER — HEALTH MAINTENANCE LETTER (OUTPATIENT)
Age: 30
End: 2020-03-11

## 2021-01-03 ENCOUNTER — HEALTH MAINTENANCE LETTER (OUTPATIENT)
Age: 31
End: 2021-01-03

## 2021-04-25 ENCOUNTER — HEALTH MAINTENANCE LETTER (OUTPATIENT)
Age: 31
End: 2021-04-25

## 2021-10-10 ENCOUNTER — HEALTH MAINTENANCE LETTER (OUTPATIENT)
Age: 31
End: 2021-10-10

## 2022-05-21 ENCOUNTER — HEALTH MAINTENANCE LETTER (OUTPATIENT)
Age: 32
End: 2022-05-21

## 2022-09-18 ENCOUNTER — HEALTH MAINTENANCE LETTER (OUTPATIENT)
Age: 32
End: 2022-09-18

## 2023-06-04 ENCOUNTER — HEALTH MAINTENANCE LETTER (OUTPATIENT)
Age: 33
End: 2023-06-04

## (undated) DEVICE — SU VICRYL 6-0 S-14DA 18" UND J670G

## (undated) DEVICE — DRAPE STERI TOWEL LG 1010

## (undated) DEVICE — EYE SPONGE SPEAR WECK CEL 0008685

## (undated) DEVICE — EYE PREP BETADINE 5% SOLUTION 30ML 0065-0411-30

## (undated) DEVICE — LINEN TOWEL PACK X5 5464

## (undated) DEVICE — SOL WATER IRRIG 500ML BOTTLE 2F7113

## (undated) DEVICE — GLOVE PROTEXIS MICRO 7.5  2D73PM75

## (undated) DEVICE — PACK MINOR EYE CUSTOM ASC

## (undated) RX ORDER — DEXAMETHASONE SODIUM PHOSPHATE 4 MG/ML
INJECTION, SOLUTION INTRA-ARTICULAR; INTRALESIONAL; INTRAMUSCULAR; INTRAVENOUS; SOFT TISSUE
Status: DISPENSED
Start: 2019-01-14

## (undated) RX ORDER — BALANCED SALT SOLUTION 6.4; .75; .48; .3; 3.9; 1.7 MG/ML; MG/ML; MG/ML; MG/ML; MG/ML; MG/ML
SOLUTION OPHTHALMIC
Status: DISPENSED
Start: 2019-01-14

## (undated) RX ORDER — ACETAMINOPHEN 325 MG/1
TABLET ORAL
Status: DISPENSED
Start: 2019-01-14

## (undated) RX ORDER — PROPOFOL 10 MG/ML
INJECTION, EMULSION INTRAVENOUS
Status: DISPENSED
Start: 2019-01-14

## (undated) RX ORDER — FENTANYL CITRATE 50 UG/ML
INJECTION, SOLUTION INTRAMUSCULAR; INTRAVENOUS
Status: DISPENSED
Start: 2019-01-14

## (undated) RX ORDER — LIDOCAINE HYDROCHLORIDE 20 MG/ML
INJECTION, SOLUTION EPIDURAL; INFILTRATION; INTRACAUDAL; PERINEURAL
Status: DISPENSED
Start: 2019-01-14

## (undated) RX ORDER — OXYCODONE HYDROCHLORIDE 5 MG/1
TABLET ORAL
Status: DISPENSED
Start: 2019-01-14

## (undated) RX ORDER — ONDANSETRON 2 MG/ML
INJECTION INTRAMUSCULAR; INTRAVENOUS
Status: DISPENSED
Start: 2019-01-14

## (undated) RX ORDER — GABAPENTIN 300 MG/1
CAPSULE ORAL
Status: DISPENSED
Start: 2019-01-14

## (undated) RX ORDER — GLYCOPYRROLATE 0.2 MG/ML
INJECTION INTRAMUSCULAR; INTRAVENOUS
Status: DISPENSED
Start: 2019-01-14